# Patient Record
Sex: FEMALE | Race: BLACK OR AFRICAN AMERICAN | NOT HISPANIC OR LATINO | URBAN - METROPOLITAN AREA
[De-identification: names, ages, dates, MRNs, and addresses within clinical notes are randomized per-mention and may not be internally consistent; named-entity substitution may affect disease eponyms.]

---

## 2018-12-03 NOTE — PATIENT DISCUSSION
DRY EYES : Discussed with patient the importance of keeping the eye moist and the symptoms associated with dry eyes including blurry vision, tearing, burning, and aubree sensation. Advised patient to minimize use of any fans blowing directly on the face. Advised patient to continue with artificial tears 2-3 times daily.

## 2020-10-14 ENCOUNTER — IMPORTED ENCOUNTER (OUTPATIENT)
Dept: URBAN - METROPOLITAN AREA CLINIC 50 | Facility: CLINIC | Age: 70
End: 2020-10-14

## 2021-04-17 ASSESSMENT — VISUAL ACUITY
OD_CC: 20/20-1
OD_BAT: 20/30
OD_OTHER: 20/30. 20/50.
OS_OTHER: 20/30. 20/50.
OS_BAT: 20/30
OS_CC: 20/25
OD_CC: J1+
OS_CC: J1+

## 2021-04-17 ASSESSMENT — TONOMETRY
OD_IOP_MMHG: 13
OS_IOP_MMHG: 14

## 2021-10-26 ENCOUNTER — PREPPED CHART (OUTPATIENT)
Dept: URBAN - METROPOLITAN AREA CLINIC 53 | Facility: CLINIC | Age: 71
End: 2021-10-26

## 2021-11-04 ENCOUNTER — ANNUAL COMPREHENSIVE EXAM (OUTPATIENT)
Dept: URBAN - METROPOLITAN AREA CLINIC 53 | Facility: CLINIC | Age: 71
End: 2021-11-04

## 2021-11-04 DIAGNOSIS — H25.13: ICD-10-CM

## 2021-11-04 PROCEDURE — NOTSEEN NOT SEEN

## 2021-11-18 ENCOUNTER — CATARACT EVALUATION (OUTPATIENT)
Dept: URBAN - METROPOLITAN AREA CLINIC 53 | Facility: CLINIC | Age: 71
End: 2021-11-18

## 2021-11-18 DIAGNOSIS — H25.13: ICD-10-CM

## 2021-11-18 DIAGNOSIS — H43.813: ICD-10-CM

## 2021-11-18 DIAGNOSIS — H35.371: ICD-10-CM

## 2021-11-18 PROCEDURE — 92015 DETERMINE REFRACTIVE STATE: CPT

## 2021-11-18 PROCEDURE — 92134 CPTRZ OPH DX IMG PST SGM RTA: CPT

## 2021-11-18 PROCEDURE — 92014 COMPRE OPH EXAM EST PT 1/>: CPT

## 2021-11-18 ASSESSMENT — TONOMETRY
OD_IOP_MMHG: 14
OS_IOP_MMHG: 16

## 2021-11-18 ASSESSMENT — VISUAL ACUITY
OU_CC: 20/25
OD_CC: 20/25+1
OD_GLARE: 20/30
OS_CC: 20/25
OS_GLARE: 20/40
OU_CC: J1
OD_GLARE: 20/25
OS_GLARE: 20/25

## 2022-04-06 ENCOUNTER — APPOINTMENT (RX ONLY)
Dept: URBAN - METROPOLITAN AREA CLINIC 56 | Facility: CLINIC | Age: 72
Setting detail: DERMATOLOGY
End: 2022-04-06

## 2022-04-06 DIAGNOSIS — Z12.83 ENCOUNTER FOR SCREENING FOR MALIGNANT NEOPLASM OF SKIN: ICD-10-CM

## 2022-04-06 DIAGNOSIS — L82.1 OTHER SEBORRHEIC KERATOSIS: ICD-10-CM

## 2022-04-06 DIAGNOSIS — L72.0 EPIDERMAL CYST: ICD-10-CM

## 2022-04-06 DIAGNOSIS — D22 MELANOCYTIC NEVI: ICD-10-CM

## 2022-04-06 DIAGNOSIS — L81.4 OTHER MELANIN HYPERPIGMENTATION: ICD-10-CM

## 2022-04-06 PROBLEM — D22.5 MELANOCYTIC NEVI OF TRUNK: Status: ACTIVE | Noted: 2022-04-06

## 2022-04-06 PROCEDURE — ? DEFER

## 2022-04-06 PROCEDURE — 99203 OFFICE O/P NEW LOW 30 MIN: CPT

## 2022-04-06 PROCEDURE — ? SUNSCREEN RECOMMENDATIONS

## 2022-04-06 PROCEDURE — ? ADDITIONAL NOTES

## 2022-04-06 PROCEDURE — ? COUNSELING

## 2022-04-06 PROCEDURE — ? FULL BODY SKIN EXAM

## 2022-04-06 ASSESSMENT — LOCATION DETAILED DESCRIPTION DERM
LOCATION DETAILED: INFERIOR MID FOREHEAD
LOCATION DETAILED: RIGHT MEDIAL UPPER BACK
LOCATION DETAILED: EPIGASTRIC SKIN
LOCATION DETAILED: LEFT MEDIAL UPPER BACK
LOCATION DETAILED: RIGHT MEDIAL MALAR CHEEK
LOCATION DETAILED: LEFT MEDIAL MALAR CHEEK

## 2022-04-06 ASSESSMENT — LOCATION SIMPLE DESCRIPTION DERM
LOCATION SIMPLE: ABDOMEN
LOCATION SIMPLE: INFERIOR FOREHEAD
LOCATION SIMPLE: LEFT CHEEK
LOCATION SIMPLE: RIGHT UPPER BACK
LOCATION SIMPLE: RIGHT CHEEK
LOCATION SIMPLE: LEFT UPPER BACK

## 2022-04-06 ASSESSMENT — LOCATION ZONE DERM
LOCATION ZONE: TRUNK
LOCATION ZONE: FACE

## 2022-04-06 NOTE — PROCEDURE: DEFER
Introduction Text (Please End With A Colon): The following procedure was deferred:
Detail Level: Zone
Instructions (Optional): Cosmetic consult with Chelsey

## 2022-04-08 ENCOUNTER — APPOINTMENT (RX ONLY)
Dept: URBAN - METROPOLITAN AREA CLINIC 57 | Facility: CLINIC | Age: 72
Setting detail: DERMATOLOGY
End: 2022-04-08

## 2022-04-08 DIAGNOSIS — L72.0 EPIDERMAL CYST: ICD-10-CM

## 2022-04-08 PROCEDURE — ? COUNSELING

## 2022-04-08 PROCEDURE — ? MEDICAL CONSULTATION HYDRAFACIAL

## 2022-04-08 PROCEDURE — ? PRODUCT LINE (SKINCEUTICALS)

## 2022-04-08 PROCEDURE — ? PRODUCT LINE (ELTA MD)

## 2022-04-08 PROCEDURE — 99212 OFFICE O/P EST SF 10 MIN: CPT

## 2022-04-08 NOTE — PROCEDURE: PRODUCT LINE (SKINCEUTICALS)
Product 50 Price (In Dollars - Numeric Only, No Special Characters Or $): 0.00
Product 24 Units: 0
Name Of Product 1: Discoloration defense
Product 3 Price (In Dollars - Numeric Only, No Special Characters Or $): 62.00
Product 1 Price (In Dollars - Numeric Only, No Special Characters Or $): 98.00
Product 3 Application Directions: Apply nickel size amount to face twice daily
Product 1 Application Directions: Apply one drop per cheek in the morning and evening prior to moisturizer
Detail Level: Zone
Name Of Product 4: Hydrating B5 gel
Assigning Risk Information: Per AMA, level of risk is based upon consequences of the problem(s) addressed at the encounter when appropriately treated. Risk also includes medical decision making related to the need to initiate or forego further testing, treatment and/or hospitalization. Over the counter medication are assigned a risk level of low. Prescription medication management is assigned a risk level of moderate.
Product 4 Price (In Dollars - Numeric Only, No Special Characters Or $): 82.00
Name Of Product 2: H.A intensifier
Risk Of Complication Category: No MDM
Product 4 Units: 1
Product 4 Application Directions: Apply one drop to the forehead, and each cheek. Rub in. Repeat twice daily
Allow Plan To Count Towards E/M Coding: Yes
Product 2 Application Directions: Apply 3 drops to face, twice daily on a clean face. Follow with moisturizer
Name Of Product 3: Daily Moisture

## 2022-04-08 NOTE — PROCEDURE: PRODUCT LINE (ELTA MD)
Product 40 Price (In Dollars - Numeric Only, No Special Characters Or $): 0.00
Risk Of Complication Category: No MDM
Assigning Risk Information: Per AMA, level of risk is based upon consequences of the problem(s) addressed at the encounter when appropriately treated. Risk also includes medical decision making related to the need to initiate or forego further testing, treatment and/or hospitalization. Over the counter medication are assigned a risk level of low. Prescription medication management is assigned a risk level of moderate.
Product 45 Units: 0
Product 1 Application Directions: Apply moisturizer prior to sunscreen application\\nReapply sun screen every 90 mins during sun exposure \\nUtilize sugar scrub body exfoliation to remove sunblock
Product 3 Application Directions: Apply nickel size amount to face AM/PM
Allow Plan To Count Towards E/M Coding: Yes
Name Of Product 4: UV elements
Name Of Product 2: Elta MD foaming cleanser
Product 4 Price (In Dollars - Numeric Only, No Special Characters Or $): 35.50
Product 2 Price (In Dollars - Numeric Only, No Special Characters Or $): 28.00
Name Of Product 1: UV Pure
Product 4 Application Directions: Apply nickel size amount in the AM post moisturizer
Product 2 Application Directions: Wash face AM/PM
Product 2 Units: 1
Detail Level: Zone
Name Of Product 3: Laser enzyme gel
Product 3 Price (In Dollars - Numeric Only, No Special Characters Or $): 14.00

## 2022-04-21 ENCOUNTER — APPOINTMENT (RX ONLY)
Dept: URBAN - METROPOLITAN AREA CLINIC 57 | Facility: CLINIC | Age: 72
Setting detail: DERMATOLOGY
End: 2022-04-21

## 2022-04-21 DIAGNOSIS — L72.0 EPIDERMAL CYST: ICD-10-CM

## 2022-04-21 DIAGNOSIS — Z41.9 ENCOUNTER FOR PROCEDURE FOR PURPOSES OTHER THAN REMEDYING HEALTH STATE, UNSPECIFIED: ICD-10-CM

## 2022-04-21 PROCEDURE — ? HYDRAFACIAL

## 2022-04-21 PROCEDURE — ? COSMETIC CONSULTATION: CHEMICAL PEELS

## 2022-04-21 PROCEDURE — ? COSMETIC EXTRACTIONS

## 2022-04-21 ASSESSMENT — LOCATION ZONE DERM: LOCATION ZONE: FACE

## 2022-04-21 ASSESSMENT — LOCATION DETAILED DESCRIPTION DERM
LOCATION DETAILED: RIGHT MEDIAL FOREHEAD
LOCATION DETAILED: LEFT MEDIAL FOREHEAD
LOCATION DETAILED: LEFT INFERIOR MEDIAL MALAR CHEEK

## 2022-04-21 ASSESSMENT — LOCATION SIMPLE DESCRIPTION DERM
LOCATION SIMPLE: RIGHT FOREHEAD
LOCATION SIMPLE: LEFT FOREHEAD
LOCATION SIMPLE: LEFT CHEEK

## 2022-04-21 NOTE — PROCEDURE: HYDRAFACIAL
Number Of Passes: 0
Solution Override
Tip: Hydropeel Tip, Purple Aggression
Solution: GlySal 15%
Location: face
Consent: Written consent obtained, risks reviewed including but not limited to crusting, scabbing, blistering, scarring, darker or lighter pigmentary change, bruising, and/or incomplete response.
Solution Override: Antiox +
Solution Override: Circadia Protec Plus Booster
Post-Care Instructions: I reviewed with the patient in detail post-care instructions. Patient should avoid direct sun exposure and wear sunscreen daily.
Solution: Activ-4
Tip: Hydropeel Tip, Clear
Tip Override
Vacuum Pressure High Setting (Will Not Render If Set To 0): 10
Price (Use Numbers Only, No Special Characters Or $): 299.00
Solution: Antiox-6
Vacuum Pressure Low Setting (Will Not Render If Set To 0): 16
Tip: Hydropeel Tip, Teal
Vacuum Pressure Low Setting (Will Not Render If Set To 0): 14
Treatment Number: 1
Procedure: Peel
Indication: anti-aging

## 2022-04-21 NOTE — PROCEDURE: COSMETIC EXTRACTIONS
Consent: The patient's consent was obtained including but not limited to risks of crusting, scabbing, blistering, scarring, darker or lighter pigmentary change, recurrence, incomplete removal and infection.
Post-Care Instructions: I reviewed with the patient in detail post-care instructions. Patient is to wear sunprotection, and avoid picking at any of the treated lesions. Pt may apply Vaseline to crusted or scabbing areas.
Detail Level: Zone
Anesthesia Volume In Cc: 0
Render The Number Of Extractions: No

## 2022-08-09 ENCOUNTER — APPOINTMENT (RX ONLY)
Dept: URBAN - METROPOLITAN AREA CLINIC 57 | Facility: CLINIC | Age: 72
Setting detail: DERMATOLOGY
End: 2022-08-09

## 2022-08-09 DIAGNOSIS — Z41.9 ENCOUNTER FOR PROCEDURE FOR PURPOSES OTHER THAN REMEDYING HEALTH STATE, UNSPECIFIED: ICD-10-CM

## 2022-08-09 PROCEDURE — ? HYDRAFACIAL

## 2022-08-09 PROCEDURE — ? COSMETIC EXTRACTIONS

## 2022-08-09 PROCEDURE — ? ADDITIONAL NOTES

## 2022-08-09 ASSESSMENT — LOCATION DETAILED DESCRIPTION DERM
LOCATION DETAILED: RIGHT INFERIOR FOREHEAD
LOCATION DETAILED: LEFT INFERIOR MEDIAL FOREHEAD
LOCATION DETAILED: LEFT INFERIOR FOREHEAD
LOCATION DETAILED: RIGHT MEDIAL FOREHEAD
LOCATION DETAILED: LEFT MEDIAL FOREHEAD

## 2022-08-09 ASSESSMENT — LOCATION SIMPLE DESCRIPTION DERM
LOCATION SIMPLE: LEFT FOREHEAD
LOCATION SIMPLE: RIGHT FOREHEAD

## 2022-08-09 ASSESSMENT — LOCATION ZONE DERM: LOCATION ZONE: FACE

## 2022-08-09 NOTE — PROCEDURE: ADDITIONAL NOTES
Additional Notes: Discussed, recommended and provided samples of:\\nRevision dej night cream (use 2x weekly)\\nRevision hydrating serum
Render Risk Assessment In Note?: yes
Detail Level: Zone

## 2022-08-09 NOTE — PROCEDURE: HYDRAFACIAL
Tip: Hydropeel Tip, Clear
Procedure: Peel
Vacuum Pressure High Setting (Will Not Render If Set To 0): 0
Solution Override
Vacuum Pressure Low Setting (Will Not Render If Set To 0): 14
Indication: anti-aging
Tip: Hydropeel Tip, Purple Aggression
Solution Override: Circadia Protec Plus Booster
Solution: GlySal 7.5%
Vacuum Pressure High Setting (Will Not Render If Set To 0): 10
Location: face
Vacuum Pressure Low Setting (Will Not Render If Set To 0): 16
Solution: Activ-4
Location Override: face and neck
Solution: Antiox-6
Tip Override
Consent: Written consent obtained, risks reviewed including but not limited to crusting, scabbing, blistering, scarring, darker or lighter pigmentary change, bruising, and/or incomplete response.
Post-Care Instructions: I reviewed with the patient in detail post-care instructions. Patient should avoid direct sun exposure and wear sunscreen daily.
Price (Use Numbers Only, No Special Characters Or $): 299.00
Solution Override: Antiox +

## 2022-08-09 NOTE — PROCEDURE: COSMETIC EXTRACTIONS
Anesthesia Volume In Cc: 0
Post-Care Instructions: I reviewed with the patient in detail post-care instructions. Patient is to wear sunprotection, and avoid picking at any of the treated lesions. Pt may apply Vaseline to crusted or scabbing areas.
Render The Number Of Extractions: Yes
Consent: The patient's consent was obtained including but not limited to risks of crusting, scabbing, blistering, scarring, darker or lighter pigmentary change, recurrence, incomplete removal and infection.
Detail Level: Zone

## 2022-09-06 ENCOUNTER — APPOINTMENT (RX ONLY)
Dept: URBAN - METROPOLITAN AREA CLINIC 57 | Facility: CLINIC | Age: 72
Setting detail: DERMATOLOGY
End: 2022-09-06

## 2022-09-06 DIAGNOSIS — Z41.9 ENCOUNTER FOR PROCEDURE FOR PURPOSES OTHER THAN REMEDYING HEALTH STATE, UNSPECIFIED: ICD-10-CM

## 2022-09-06 PROCEDURE — ? COSMETIC EXTRACTIONS

## 2022-09-06 PROCEDURE — ? HYDRAFACIAL

## 2022-09-06 ASSESSMENT — LOCATION SIMPLE DESCRIPTION DERM: LOCATION SIMPLE: RIGHT FOREHEAD

## 2022-09-06 ASSESSMENT — LOCATION ZONE DERM: LOCATION ZONE: FACE

## 2022-09-06 ASSESSMENT — LOCATION DETAILED DESCRIPTION DERM: LOCATION DETAILED: RIGHT MEDIAL FOREHEAD

## 2022-09-06 NOTE — PROCEDURE: COSMETIC EXTRACTIONS
Detail Level: Zone
Render The Number Of Extractions: No
Consent: The patient's consent was obtained including but not limited to risks of crusting, scabbing, blistering, scarring, darker or lighter pigmentary change, recurrence, incomplete removal and infection.
Post-Care Instructions: I reviewed with the patient in detail post-care instructions. Patient is to wear sunprotection, and avoid picking at any of the treated lesions. Pt may apply Vaseline to crusted or scabbing areas.
Anesthesia Volume In Cc: 0

## 2022-10-07 ENCOUNTER — APPOINTMENT (RX ONLY)
Dept: URBAN - METROPOLITAN AREA CLINIC 57 | Facility: CLINIC | Age: 72
Setting detail: DERMATOLOGY
End: 2022-10-07

## 2022-10-07 DIAGNOSIS — Z41.9 ENCOUNTER FOR PROCEDURE FOR PURPOSES OTHER THAN REMEDYING HEALTH STATE, UNSPECIFIED: ICD-10-CM

## 2022-10-07 PROCEDURE — ? HYDRAFACIAL

## 2022-10-07 NOTE — PROCEDURE: HYDRAFACIAL
Price (Use Numbers Only, No Special Characters Or $): 745 Price (Use Numbers Only, No Special Characters Or $): 735

## 2022-10-07 NOTE — PROCEDURE: HYDRAFACIAL
Post-Care Instructions: I reviewed with the patient in detail post-care instructions. Patient should avoid direct sun exposure and wear sunscreen daily.

## 2022-12-01 ENCOUNTER — COMPREHENSIVE EXAM (OUTPATIENT)
Dept: URBAN - METROPOLITAN AREA CLINIC 53 | Facility: CLINIC | Age: 72
End: 2022-12-01

## 2022-12-01 PROCEDURE — 92134 CPTRZ OPH DX IMG PST SGM RTA: CPT

## 2022-12-01 PROCEDURE — 92014 COMPRE OPH EXAM EST PT 1/>: CPT

## 2022-12-01 ASSESSMENT — VISUAL ACUITY
OS_CC: 20/25
OD_GLARE: 20/50
OS_GLARE: 20/30
OD_CC: 20/50
OS_GLARE: 20/40
OD_GLARE: 20/60
OU_CC: J1+ @ 18 IN

## 2022-12-01 ASSESSMENT — TONOMETRY
OS_IOP_MMHG: 15
OD_IOP_MMHG: 15

## 2022-12-01 NOTE — PATIENT DISCUSSION
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 I have recommended follow up with a retina specialist for careful monitoring for the development of any future complications such as macular edema or retinal neovascularization.

## 2022-12-01 NOTE — PATIENT DISCUSSION
BRVO is an occlusion of a branch of the central retinal vein in the eye. Retinal vein occlusions are typically associated with age, hypertension, atherosclerosis, diabetes, and in the case of central retinal vein occlusions, also glaucoma. Unfortunately, no effective treatment is available to treat the intravascular thrombus at the current time. All of our treatments are aimed at treating the complications of retinal vein occlusions, namely macular edema and/or retinal or iris neovascularization.

## 2023-07-31 NOTE — PROCEDURE: HYDRAFACIAL
Solution Override: Antiox +
Vacuum Pressure High Setting (Will Not Render If Set To 0): 0
Location: face
Tip: Hydropeel Tip, Purple Aggression
Post-Care Instructions: I reviewed with the patient in detail post-care instructions. Patient should avoid direct sun exposure and wear sunscreen daily.
Tip Override
Procedure: Peel
Solution Override
Tip: Hydropeel Tip, Clear
Solution: GlySal 15%
Solution: Activ-4
Price (Use Numbers Only, No Special Characters Or $): 299.00
Solution Override: Circadia Protec Plus Booster
Vacuum Pressure Low Setting (Will Not Render If Set To 0): 16
Vacuum Pressure High Setting (Will Not Render If Set To 0): 10
Indication: anti-aging
Vacuum Pressure Low Setting (Will Not Render If Set To 0): 14
Solution: Antiox-6
Consent: Written consent obtained, risks reviewed including but not limited to crusting, scabbing, blistering, scarring, darker or lighter pigmentary change, bruising, and/or incomplete response.
Principal Discharge DX:	Vaginal bleeding  
1

## 2023-12-21 ENCOUNTER — COMPREHENSIVE EXAM (OUTPATIENT)
Dept: URBAN - METROPOLITAN AREA CLINIC 53 | Facility: CLINIC | Age: 73
End: 2023-12-21

## 2023-12-21 DIAGNOSIS — H35.371: ICD-10-CM

## 2023-12-21 DIAGNOSIS — H34.8310: ICD-10-CM

## 2023-12-21 DIAGNOSIS — H43.813: ICD-10-CM

## 2023-12-21 DIAGNOSIS — H25.13: ICD-10-CM

## 2023-12-21 PROCEDURE — 92134 CPTRZ OPH DX IMG PST SGM RTA: CPT

## 2023-12-21 PROCEDURE — 92015 DETERMINE REFRACTIVE STATE: CPT

## 2023-12-21 PROCEDURE — 99214 OFFICE O/P EST MOD 30 MIN: CPT

## 2023-12-21 ASSESSMENT — TONOMETRY
OS_IOP_MMHG: 19
OD_IOP_MMHG: 19

## 2023-12-21 ASSESSMENT — VISUAL ACUITY
OS_GLARE: 20/25
OS_GLARE: 20/20
OD_GLARE: 20/30
OD_GLARE: 20/25
OS_CC: 20/25-2
OU_CC: J1+@14"
OD_CC: 20/20-2

## 2023-12-21 ASSESSMENT — KERATOMETRY
OS_AXISANGLE2_DEGREES: 170
OD_AXISANGLE_DEGREES: 122
OS_K1POWER_DIOPTERS: 40.50
OD_AXISANGLE2_DEGREES: 32
OS_AXISANGLE_DEGREES: 080
OD_K2POWER_DIOPTERS: 40.75
OD_K1POWER_DIOPTERS: 40.25
OS_K2POWER_DIOPTERS: 41.00

## 2024-01-18 ENCOUNTER — APPOINTMENT (RX ONLY)
Dept: URBAN - METROPOLITAN AREA CLINIC 342 | Facility: CLINIC | Age: 74
Setting detail: DERMATOLOGY
End: 2024-01-18

## 2024-01-18 DIAGNOSIS — L82.1 OTHER SEBORRHEIC KERATOSIS: ICD-10-CM | Status: STABLE

## 2024-01-18 DIAGNOSIS — L82.1 OTHER SEBORRHEIC KERATOSIS: ICD-10-CM

## 2024-01-18 DIAGNOSIS — L81.1 CHLOASMA: ICD-10-CM

## 2024-01-18 DIAGNOSIS — L81.4 OTHER MELANIN HYPERPIGMENTATION: ICD-10-CM | Status: STABLE

## 2024-01-18 DIAGNOSIS — D18.0 HEMANGIOMA: ICD-10-CM | Status: STABLE

## 2024-01-18 DIAGNOSIS — D22 MELANOCYTIC NEVI: ICD-10-CM | Status: STABLE

## 2024-01-18 PROBLEM — D22.9 MELANOCYTIC NEVI, UNSPECIFIED: Status: ACTIVE | Noted: 2024-01-18

## 2024-01-18 PROBLEM — D18.01 HEMANGIOMA OF SKIN AND SUBCUTANEOUS TISSUE: Status: ACTIVE | Noted: 2024-01-18

## 2024-01-18 PROCEDURE — 99213 OFFICE O/P EST LOW 20 MIN: CPT

## 2024-01-18 PROCEDURE — ? FULL BODY SKIN EXAM

## 2024-01-18 PROCEDURE — ? COUNSELING

## 2024-01-18 PROCEDURE — ? SUNSCREEN RECOMMENDATIONS

## 2024-01-18 ASSESSMENT — LOCATION DETAILED DESCRIPTION DERM
LOCATION DETAILED: LEFT POSTERIOR SHOULDER
LOCATION DETAILED: LEFT PROXIMAL DORSAL FOREARM
LOCATION DETAILED: LEFT CENTRAL MALAR CHEEK
LOCATION DETAILED: SUPERIOR THORACIC SPINE
LOCATION DETAILED: RIGHT POSTERIOR SHOULDER
LOCATION DETAILED: RIGHT INFERIOR UPPER BACK
LOCATION DETAILED: RIGHT MEDIAL UPPER BACK
LOCATION DETAILED: RIGHT CENTRAL MALAR CHEEK
LOCATION DETAILED: RIGHT PROXIMAL DORSAL FOREARM
LOCATION DETAILED: UPPER STERNUM
LOCATION DETAILED: RIGHT INFERIOR MEDIAL FOREHEAD
LOCATION DETAILED: EPIGASTRIC SKIN

## 2024-01-18 ASSESSMENT — LOCATION SIMPLE DESCRIPTION DERM
LOCATION SIMPLE: RIGHT FOREHEAD
LOCATION SIMPLE: RIGHT UPPER BACK
LOCATION SIMPLE: LEFT SHOULDER
LOCATION SIMPLE: UPPER BACK
LOCATION SIMPLE: RIGHT FOREARM
LOCATION SIMPLE: CHEST
LOCATION SIMPLE: RIGHT SHOULDER
LOCATION SIMPLE: RIGHT CHEEK
LOCATION SIMPLE: ABDOMEN
LOCATION SIMPLE: LEFT CHEEK
LOCATION SIMPLE: LEFT FOREARM

## 2024-01-18 ASSESSMENT — LOCATION ZONE DERM
LOCATION ZONE: ARM
LOCATION ZONE: FACE
LOCATION ZONE: TRUNK

## 2024-01-18 NOTE — HPI: EVALUATION OF SKIN LESION(S)
What Type Of Note Output Would You Prefer (Optional)?: Bullet Format
Hpi Title: Evaluation of Skin Lesions
How Severe Are Your Spot(S)?: mild
Have Your Spot(S) Been Treated In The Past?: has not been treated
Additional History: Patient reports that she is concerned about the dark moles on her face, no pmx or fmx of skin cancer. Patient states that while visiting her gynecologist, her gynecologist instructed her to f/u with dermatologist for abnormal appearing moles around her breast. Patient denies tenderness, bleeding, non healing, or itching.

## 2024-01-18 NOTE — PROCEDURE: SUNSCREEN RECOMMENDATIONS

## 2025-01-16 ENCOUNTER — COMPREHENSIVE EXAM (OUTPATIENT)
Age: 75
End: 2025-01-16

## 2025-01-16 DIAGNOSIS — H25.13: ICD-10-CM

## 2025-01-16 DIAGNOSIS — H43.813: ICD-10-CM

## 2025-01-16 DIAGNOSIS — H35.371: ICD-10-CM

## 2025-01-16 DIAGNOSIS — H34.8310: ICD-10-CM

## 2025-01-16 PROCEDURE — 92134 CPTRZ OPH DX IMG PST SGM RTA: CPT

## 2025-01-16 PROCEDURE — 99214 OFFICE O/P EST MOD 30 MIN: CPT

## 2025-01-30 ENCOUNTER — APPOINTMENT (OUTPATIENT)
Dept: URBAN - METROPOLITAN AREA CLINIC 342 | Facility: CLINIC | Age: 75
Setting detail: DERMATOLOGY
End: 2025-01-30

## 2025-01-30 DIAGNOSIS — L82.1 OTHER SEBORRHEIC KERATOSIS: ICD-10-CM | Status: STABLE

## 2025-01-30 DIAGNOSIS — L81.4 OTHER MELANIN HYPERPIGMENTATION: ICD-10-CM | Status: STABLE

## 2025-01-30 DIAGNOSIS — D22 MELANOCYTIC NEVI: ICD-10-CM | Status: STABLE

## 2025-01-30 DIAGNOSIS — D18.0 HEMANGIOMA: ICD-10-CM | Status: STABLE

## 2025-01-30 DIAGNOSIS — L81.5 LEUKODERMA, NOT ELSEWHERE CLASSIFIED: ICD-10-CM | Status: STABLE

## 2025-01-30 PROBLEM — D18.01 HEMANGIOMA OF SKIN AND SUBCUTANEOUS TISSUE: Status: ACTIVE | Noted: 2025-01-30

## 2025-01-30 PROBLEM — D22.71 MELANOCYTIC NEVI OF RIGHT LOWER LIMB, INCLUDING HIP: Status: ACTIVE | Noted: 2025-01-30

## 2025-01-30 PROBLEM — D22.5 MELANOCYTIC NEVI OF TRUNK: Status: ACTIVE | Noted: 2025-01-30

## 2025-01-30 PROCEDURE — ? COUNSELING

## 2025-01-30 PROCEDURE — ? TREATMENT REGIMEN

## 2025-01-30 PROCEDURE — 99213 OFFICE O/P EST LOW 20 MIN: CPT

## 2025-01-30 PROCEDURE — ? FULL BODY SKIN EXAM

## 2025-01-30 ASSESSMENT — LOCATION DETAILED DESCRIPTION DERM
LOCATION DETAILED: RIGHT PROXIMAL DORSAL FOREARM
LOCATION DETAILED: SUPERIOR THORACIC SPINE
LOCATION DETAILED: UPPER STERNUM
LOCATION DETAILED: LEFT PROXIMAL PRETIBIAL REGION
LOCATION DETAILED: LEFT PROXIMAL DORSAL FOREARM
LOCATION DETAILED: RIGHT INFERIOR MEDIAL UPPER BACK
LOCATION DETAILED: RIGHT INFERIOR UPPER BACK
LOCATION DETAILED: EPIGASTRIC SKIN
LOCATION DETAILED: RIGHT INFERIOR MEDIAL FOREHEAD
LOCATION DETAILED: RIGHT MEDIAL PLANTAR 1ST TOE
LOCATION DETAILED: LEFT POSTERIOR SHOULDER
LOCATION DETAILED: RIGHT POSTERIOR SHOULDER

## 2025-01-30 ASSESSMENT — LOCATION ZONE DERM
LOCATION ZONE: TOE
LOCATION ZONE: FACE
LOCATION ZONE: TRUNK
LOCATION ZONE: LEG
LOCATION ZONE: ARM

## 2025-01-30 ASSESSMENT — LOCATION SIMPLE DESCRIPTION DERM
LOCATION SIMPLE: PLANTAR SURFACE OF RIGHT 1ST TOE
LOCATION SIMPLE: LEFT PRETIBIAL REGION
LOCATION SIMPLE: LEFT FOREARM
LOCATION SIMPLE: CHEST
LOCATION SIMPLE: ABDOMEN
LOCATION SIMPLE: LEFT SHOULDER
LOCATION SIMPLE: RIGHT UPPER BACK
LOCATION SIMPLE: RIGHT FOREARM
LOCATION SIMPLE: RIGHT FOREHEAD
LOCATION SIMPLE: UPPER BACK
LOCATION SIMPLE: RIGHT SHOULDER

## 2025-01-30 NOTE — HPI: EVALUATION OF SKIN LESION(S)
What Type Of Note Output Would You Prefer (Optional)?: Standard Output
Hpi Title: Evaluation of Skin Lesions
How Severe Are Your Spot(S)?: mild
Have Your Spot(S) Been Treated In The Past?: has not been treated
Additional History: Patient is experiencing bumps on face around the nose, not itchy or irritated.

## 2025-01-30 NOTE — PROCEDURE: COUNSELING
"Pending Prescriptions:                       Disp   Refills    FLUoxetine (PROZAC) 20 MG capsule [Pharmac*90 cap*0        Sig: TAKE 1 CAPSULE(20 MG) BY MOUTH DAILY    Routing refill request to provider for review/approval because:  PHQ-9 score:    PHQ 5/5/2022   PHQ-9 Total Score 18   Q9: Thoughts of better off dead/self-harm past 2 weeks Not at all                 Requested Prescriptions   Pending Prescriptions Disp Refills    FLUoxetine (PROZAC) 20 MG capsule [Pharmacy Med Name: FLUOXETINE 20MG CAPSULES] 90 capsule 0     Sig: TAKE 1 CAPSULE(20 MG) BY MOUTH DAILY        SSRIs Protocol Passed - 5/30/2022  4:02 AM        Passed - Recent (12 mo) or future (30 days) visit within the authorizing provider's specialty     Patient has had an office visit with the authorizing provider or a provider within the authorizing providers department within the previous 12 mos or has a future within next 30 days. See \"Patient Info\" tab in inbasket, or \"Choose Columns\" in Meds & Orders section of the refill encounter.              Passed - Medication is active on med list        Passed - Patient is age 18 or older        Passed - No active pregnancy on record        Passed - No positive pregnancy test in last 12 months                    "
Detail Level: Generalized
Detail Level: Zone

## 2025-02-20 ENCOUNTER — APPOINTMENT (OUTPATIENT)
Dept: URBAN - METROPOLITAN AREA CLINIC 342 | Facility: CLINIC | Age: 75
Setting detail: DERMATOLOGY
End: 2025-02-20

## 2025-02-20 ENCOUNTER — FOLLOW UP (OUTPATIENT)
Age: 75
End: 2025-02-20

## 2025-02-20 DIAGNOSIS — H00.11: ICD-10-CM

## 2025-02-20 DIAGNOSIS — Z41.9 ENCOUNTER FOR PROCEDURE FOR PURPOSES OTHER THAN REMEDYING HEALTH STATE, UNSPECIFIED: ICD-10-CM

## 2025-02-20 PROCEDURE — ? MEDICAL CONSULTATION HYDRAFACIAL

## 2025-02-20 PROCEDURE — ? ADDITIONAL NOTES

## 2025-02-20 PROCEDURE — ? PRODUCT LINE (REVISION)

## 2025-02-20 PROCEDURE — 99213 OFFICE O/P EST LOW 20 MIN: CPT

## 2025-02-20 RX ORDER — TOBRAMYCIN AND DEXAMETHASONE 1; 3 MG/ML; MG/ML
1 SUSPENSION/ DROPS OPHTHALMIC
Start: 2025-02-20

## 2025-02-20 ASSESSMENT — LOCATION ZONE DERM: LOCATION ZONE: FACE

## 2025-02-20 ASSESSMENT — LOCATION SIMPLE DESCRIPTION DERM: LOCATION SIMPLE: INFERIOR FOREHEAD

## 2025-02-20 ASSESSMENT — LOCATION DETAILED DESCRIPTION DERM: LOCATION DETAILED: INFERIOR MID FOREHEAD

## 2025-02-20 NOTE — PROCEDURE: ADDITIONAL NOTES
Detail Level: Zone
Render Risk Assessment In Note?: yes
Additional Notes: Patient is to use retinol every other night, with moisturizer (neutragena hydroboost)\\nIf irritation occurs, patient has been instructed to apply a thin layer of moisturizer first, followed by the retinol application, and then a secondary application of moisturizer.

## 2025-02-20 NOTE — PROCEDURE: PRODUCT LINE (REVISION)
Name Of Product 4: DEJ eye cream
Name Of Product 23: C+ eye cream
Name Of Product 19: Finishing Touch
Product 6 Price (In Dollars - Numeric Only, No Special Characters Or $): 114.00
Name Of Product 25: CMT post procedure care
Name Of Product 17: Papaya Cleanser
Product 38 Units: 0
Send Charges To Patient Encounter: Yes
Product 27 Price (In Dollars - Numeric Only, No Special Characters Or $): 70.00
Product 32 Price (In Dollars - Numeric Only, No Special Characters Or $): 0.00
Name Of Product 15: Dej Face cream (travel size)
Name Of Product 9: Lumiquin hand cream
Name Of Product 21: Intellishade Truphysical clear
Name Of Product 11: Revox Line Relaxer 1.0oz
Name Of Product 13: Pumpkin Enzyme Mask
Product 4 Price (In Dollars - Numeric Only, No Special Characters Or $): 120.00
Product 17 Price (In Dollars - Numeric Only, No Special Characters Or $): 38.00
Name Of Product 41: Dej boosting serum (gift with purchase)
Product 15 Price (In Dollars - Numeric Only, No Special Characters Or $): 79.00
Product 9 Price (In Dollars - Numeric Only, No Special Characters Or $): 58.00
Product 21 Price (In Dollars - Numeric Only, No Special Characters Or $): 86.00
Product 11 Price (In Dollars - Numeric Only, No Special Characters Or $): 192.00
Product 13 Price (In Dollars - Numeric Only, No Special Characters Or $): 40.00
Product 19 Price (In Dollars - Numeric Only, No Special Characters Or $): 48.00
Name Of Product 7: Soothing facial rinse
Product 17 Application Directions: Wash face every other day with this cleanser and rotate with gentle cleanser
Name Of Product 1: Vitamin C+ Corrective Cream 30% (patient did not purchase)
Name Of Product 26: Gentle cleanser
Product 15 Application Directions: Use twice daily
Detail Level: Zone
Product 13 Application Directions: Using fingertips, apply generous amount hands, lightly scrub mask onto face avoiding eye area. Leave mask on 15-20 minutes. Remove with warm water on a clean wash cloth. Use once weekly.
Product 19 Application Directions: Use once a week
Name Of Product 5: DEJ Night Face Cream
Name Of Product 22: Intellishade truphysical tinted
Name Of Product 24: Retinol 0.5%
Name Of Product 18: Gentle Cleansing Lotion
Product 1 Price (In Dollars - Numeric Only, No Special Characters Or $): 185.00
Product 26 Price (In Dollars - Numeric Only, No Special Characters Or $): 50.00
Name Of Product 12: Youthful lip replenisher
Name Of Product 16: Vitamin C 15%
Name Of Product 3: DEJ Face Cream
Name Of Product 10: Retinol 1.0%
Name Of Product 14: Brightening face cleanser
Product 5 Price (In Dollars - Numeric Only, No Special Characters Or $): 165.00
Product 24 Price (In Dollars - Numeric Only, No Special Characters Or $): 121.00
Assigning Risk Information: Per AMA, level of risk is based upon consequences of the problem(s) addressed at the encounter when appropriately treated. Risk also includes medical decision making related to the need to initiate or forego further testing, treatment and/or hospitalization. Over the counter medication are assigned a risk level of low. Prescription medication management is assigned a risk level of moderate.
Product 1 Application Directions: Patient is to pretreat with c+ cream for a minimum of 3 weeks before sk removal.
Product 8 Price (In Dollars - Numeric Only, No Special Characters Or $): 140.00
Product 10 Price (In Dollars - Numeric Only, No Special Characters Or $): 145.00
Product 16 Price (In Dollars - Numeric Only, No Special Characters Or $): 133.00
Risk Of Complication Category: No MDM
Product 5 Application Directions: Begin using product once a week at night only for one month
Product 12 Price (In Dollars - Numeric Only, No Special Characters Or $): 36.00
Product 14 Price (In Dollars - Numeric Only, No Special Characters Or $): 45.00
Name Of Product 2: Nectifirm
Name Of Product 8: Dej night cream
Product 24 Application Directions: Use one pump Monday and Thursday nights after cleansing, then apply remaining products
Product 16 Application Directions: Apply one pump to palm of hand and apply to face every morning before moisturizer
Name Of Product 6: Hydrating Serum
Product 10 Units: 1
Product 18 Application Directions: Utilize every other day
Product 8 Application Directions: After you have cleansed face, apply a pea sized amount (1 pump) to hand, take other hand and dot the product onto your face at night. Apply moisturizer on top of retinol if becomes too drying\\n\\nUse Monday + Thursday Night
Product 14 Application Directions: Use twice daily, morning and night
Name Of Product 27: Vitamin K
Product 12 Application Directions: Apply at least 2x daily
Product 20 Application Directions: Recommended daily use unless dryness occurs
Product 2 Price (In Dollars - Numeric Only, No Special Characters Or $): 115.00

## 2025-02-27 ENCOUNTER — FOLLOW UP (OUTPATIENT)
Age: 75
End: 2025-02-27

## 2025-02-27 DIAGNOSIS — H00.011: ICD-10-CM

## 2025-02-27 PROCEDURE — 99213 OFFICE O/P EST LOW 20 MIN: CPT

## 2025-03-11 ENCOUNTER — FOLLOW UP WITH CLINIC PROCEDURE (OUTPATIENT)
Age: 75
End: 2025-03-11

## 2025-03-11 DIAGNOSIS — H00.011: ICD-10-CM

## 2025-03-11 DIAGNOSIS — H40.041: ICD-10-CM

## 2025-03-11 PROCEDURE — 11900 INJECT SKIN LESIONS </W 7: CPT

## 2025-03-11 PROCEDURE — 99214 OFFICE O/P EST MOD 30 MIN: CPT | Mod: 25

## 2025-03-11 RX ORDER — CIPROFLOXACIN HYDROCHLORIDE 500 MG/1
TABLET, FILM COATED ORAL TWICE A DAY
Start: 2025-03-11

## 2025-04-08 ENCOUNTER — FOLLOW UP (OUTPATIENT)
Age: 75
End: 2025-04-08

## 2025-04-08 DIAGNOSIS — H00.011: ICD-10-CM

## 2025-04-08 DIAGNOSIS — H40.041: ICD-10-CM

## 2025-04-08 PROCEDURE — 99213 OFFICE O/P EST LOW 20 MIN: CPT

## 2025-04-22 ENCOUNTER — APPOINTMENT (OUTPATIENT)
Dept: URBAN - METROPOLITAN AREA CLINIC 342 | Facility: CLINIC | Age: 75
Setting detail: DERMATOLOGY
End: 2025-04-22

## 2025-04-22 DIAGNOSIS — Z41.9 ENCOUNTER FOR PROCEDURE FOR PURPOSES OTHER THAN REMEDYING HEALTH STATE, UNSPECIFIED: ICD-10-CM

## 2025-04-22 PROCEDURE — ? COSMETIC FOLLOW-UP

## 2025-04-22 NOTE — PROCEDURE: COSMETIC FOLLOW-UP
Comments (Free Text): Improvement is noted, and patient has been instructed to continue recommended course of treatment.\\nDiscussed hydrafacial periodically throughout year, in addition to the facials patient is [facility].
Detail Level: Zone

## 2025-04-22 NOTE — HPI: COSMETIC FOLLOW UP
What Condition Are We Treating?: Milia
What Procedure Did We Perform At The Last Visit?: Skincare implementation

## 2025-05-27 ENCOUNTER — EMERGENCY VISIT (OUTPATIENT)
Age: 75
End: 2025-05-27

## 2025-05-27 DIAGNOSIS — H00.014: ICD-10-CM

## 2025-05-27 PROCEDURE — 99213 OFFICE O/P EST LOW 20 MIN: CPT

## 2025-05-27 RX ORDER — OFLOXACIN 3 MG/ML: 1 SOLUTION OPHTHALMIC

## 2025-06-19 ENCOUNTER — APPOINTMENT (OUTPATIENT)
Dept: URBAN - METROPOLITAN AREA CLINIC 342 | Facility: CLINIC | Age: 75
Setting detail: DERMATOLOGY
End: 2025-06-19

## 2025-06-19 DIAGNOSIS — Z41.9 ENCOUNTER FOR PROCEDURE FOR PURPOSES OTHER THAN REMEDYING HEALTH STATE, UNSPECIFIED: ICD-10-CM

## 2025-06-19 PROCEDURE — ? HYDRAFACIAL

## 2025-06-19 PROCEDURE — ? ADDITIONAL NOTES

## 2025-06-19 NOTE — PROCEDURE: ADDITIONAL NOTES
Detail Level: Zone
Additional Notes: Recommended to increase retinol usage to every other night\\nRecommended vi peel precision plus, scheduled \\nRecommended hydrafacial post vi peel, scheduled
Render Risk Assessment In Note?: yes

## 2025-06-19 NOTE — PROCEDURE: HYDRAFACIAL
Solution Override
Location: face
Vacuum Pressure High Setting (Will Not Render If Set To 0): 0
Tip Override
Solution Override: Antiox +
Solution: GlySal 7.5%
Vacuum Pressure Low Setting (Will Not Render If Set To 0): 12
Consent: Written consent obtained, risks reviewed including but not limited to crusting, scabbing, blistering, scarring, darker or lighter pigmentary change, bruising, and/or incomplete response.
Tip: Hydropeel Tip, Clear
Location Override: upper and lower lids
Solution: Activ-4
Post-Care Instructions: I reviewed with the patient in detail post-care instructions. Patient should avoid direct sun exposure and wear sunscreen daily.
Solution Override: activ4 + betaHD
Price (Use Numbers Only, No Special Characters Or $): 299.00
Vacuum Pressure Low Setting (Will Not Render If Set To 0): 11
Solution Override: Biorepeel upgrade
Indication: acne
Treatment Number: 1
Tip: Hydropeel Tip, Teal
Solution: Antiox-6
Tip: Hydropeel Tip, Blue
Procedure: Peel

## 2025-08-05 ENCOUNTER — APPOINTMENT (OUTPATIENT)
Dept: URBAN - METROPOLITAN AREA CLINIC 342 | Facility: CLINIC | Age: 75
Setting detail: DERMATOLOGY
End: 2025-08-05

## 2025-08-05 DIAGNOSIS — Z41.9 ENCOUNTER FOR PROCEDURE FOR PURPOSES OTHER THAN REMEDYING HEALTH STATE, UNSPECIFIED: ICD-10-CM

## 2025-08-05 PROCEDURE — ? VI PEEL
